# Patient Record
Sex: MALE | Race: WHITE | NOT HISPANIC OR LATINO
[De-identification: names, ages, dates, MRNs, and addresses within clinical notes are randomized per-mention and may not be internally consistent; named-entity substitution may affect disease eponyms.]

---

## 2019-06-03 PROBLEM — Z00.00 ENCOUNTER FOR PREVENTIVE HEALTH EXAMINATION: Status: ACTIVE | Noted: 2019-06-03

## 2019-06-04 ENCOUNTER — APPOINTMENT (OUTPATIENT)
Dept: ORTHOPEDIC SURGERY | Facility: CLINIC | Age: 64
End: 2019-06-04
Payer: COMMERCIAL

## 2019-06-04 PROCEDURE — 73562 X-RAY EXAM OF KNEE 3: CPT | Mod: 50

## 2019-06-04 PROCEDURE — 99213 OFFICE O/P EST LOW 20 MIN: CPT

## 2019-06-04 NOTE — ASSESSMENT
[FreeTextEntry1] : Discussed at length with patient diagnosis.  Patient elects home exercises and observation only at this time.  Patient to follow-up prn

## 2019-06-04 NOTE — HISTORY OF PRESENT ILLNESS
[de-identified] : Patient is an established patient presenting with bilateral knee mild pain.  Patient with LEFT knee arthritis and underwent cortisone injection about 10 months ago with relief.  States right knee pain over the past few days with a twisting motion but improving.  Denies any direct fall, instability.

## 2019-06-04 NOTE — PHYSICAL EXAM
[Knee Motion Right] : full ROM [Knee Motion Right Crepitus] : crepitus with ROM [Knee Lateral Instability Right] : no laxity on varus stress [Knee Medial Instability Right] : no laxity on valgus stress [Knee Motion Left] : limited ROM [Knee Motion Left Crepitus] : crepitus with ROM [Knee Anterior Drawer Sign Left] : negative anterior drawer sign [Knee Posterior Drawer Sign Left] : negative posterior drawer sign [Knee Instability Laxity Left Anterior Cruciate Ligament] : negative Lachman's test [Normal LLE] : Left Lower Extremity: No scars, rashes, lesions, ulcers, skin intact [Normal RLE] : Right Lower Extremity: No scars, rashes, lesions, ulcers, skin intact [Normal Touch] : sensation intact for touch [Normal] : Alert and in no acute distress [de-identified] : Bilateral knee x-rays:  There is severe left lateral and PF DJD with moderate medial DJD.  There is moderate right medial and PF DJD. [FreeTextEntry2] : ROM on right knee 0 - 130, on left 5- 110

## 2019-06-19 ENCOUNTER — OTHER (OUTPATIENT)
Age: 64
End: 2019-06-19

## 2019-06-19 DIAGNOSIS — M17.10 UNILATERAL PRIMARY OSTEOARTHRITIS, UNSPECIFIED KNEE: ICD-10-CM

## 2019-09-19 ENCOUNTER — RX RENEWAL (OUTPATIENT)
Age: 64
End: 2019-09-19

## 2019-09-19 RX ORDER — NABUMETONE 500 MG/1
500 TABLET, FILM COATED ORAL
Qty: 30 | Refills: 2 | Status: ACTIVE | COMMUNITY
Start: 2019-06-19 | End: 1900-01-01